# Patient Record
Sex: FEMALE | Employment: UNEMPLOYED | URBAN - METROPOLITAN AREA
[De-identification: names, ages, dates, MRNs, and addresses within clinical notes are randomized per-mention and may not be internally consistent; named-entity substitution may affect disease eponyms.]

---

## 2020-08-20 ENCOUNTER — OFFICE VISIT (OUTPATIENT)
Dept: NEUROLOGY | Facility: CLINIC | Age: 65
End: 2020-08-20
Payer: MEDICAID

## 2020-08-20 VITALS
HEART RATE: 86 BPM | BODY MASS INDEX: 27.99 KG/M2 | HEIGHT: 65 IN | TEMPERATURE: 98 F | DIASTOLIC BLOOD PRESSURE: 73 MMHG | RESPIRATION RATE: 16 BRPM | WEIGHT: 168 LBS | SYSTOLIC BLOOD PRESSURE: 128 MMHG

## 2020-08-20 DIAGNOSIS — H81.392 PERIPHERAL VERTIGO, LEFT: ICD-10-CM

## 2020-08-20 DIAGNOSIS — G47.09 OTHER INSOMNIA: ICD-10-CM

## 2020-08-20 DIAGNOSIS — F11.11 NARCOTIC ABUSE IN REMISSION (HCC): ICD-10-CM

## 2020-08-20 DIAGNOSIS — R44.1 VISUAL HALLUCINATIONS: Primary | ICD-10-CM

## 2020-08-20 PROBLEM — G47.00 INSOMNIA: Status: ACTIVE | Noted: 2020-08-20

## 2020-08-20 PROCEDURE — 3078F DIAST BP <80 MM HG: CPT | Performed by: PHYSICIAN ASSISTANT

## 2020-08-20 PROCEDURE — 3074F SYST BP LT 130 MM HG: CPT | Performed by: PHYSICIAN ASSISTANT

## 2020-08-20 PROCEDURE — 3008F BODY MASS INDEX DOCD: CPT | Performed by: PHYSICIAN ASSISTANT

## 2020-08-20 PROCEDURE — 99203 OFFICE O/P NEW LOW 30 MIN: CPT | Performed by: PHYSICIAN ASSISTANT

## 2020-08-20 PROCEDURE — 1111F DSCHRG MED/CURRENT MED MERGE: CPT | Performed by: PHYSICIAN ASSISTANT

## 2020-08-20 RX ORDER — VENLAFAXINE HYDROCHLORIDE 75 MG/1
75 CAPSULE, EXTENDED RELEASE ORAL DAILY
COMMUNITY

## 2020-08-20 RX ORDER — VENLAFAXINE HYDROCHLORIDE 150 MG/1
150 TABLET, EXTENDED RELEASE ORAL
COMMUNITY

## 2020-08-20 RX ORDER — CLONAZEPAM 1 MG/1
1 TABLET ORAL AS NEEDED
COMMUNITY

## 2020-08-20 NOTE — PROGRESS NOTES
[facility] with Dyana Beckett [address]  9/14/1955  Primary Care Provider:  Sae Flores    8/20/2020  Accompanied visit: Yes- Sister      59year old female patient being seen for: Visual H Diabetes    CT Brain(8/16/2020)  FINDINGS:    Beam hardening artifact from right sided earing mildly limits evaluation.     The ventricles, sulci, and basal cisterns are prominent, consistent with involutional changes.  Slight periventricular white matter l symmetric  FTN intact bilaterally. No drift on exam  Normal gait. Slight difficulties with tandem gait.  Swaying on romberg testing  Pass pointing on the right      Problem/s Identified this visit:   Visual hallucinations  (primary encounter diagnosis)  Oth

## 2020-08-20 NOTE — PATIENT INSTRUCTIONS
Refill policies:    • Allow 2-3 business days for refills; controlled substances may take longer.   • Contact your pharmacy at least 5 days prior to running out of medication and have them send an electronic request or submit request through the “request re Depending on your insurance carrier, approval may take 3-10 days. It is highly recommended patients contact their insurance carrier directly to determine coverage.   If test is done without insurance authorization, patient may be responsible for the entire for an EKG tracing. Head will be measured using a washable grease pencil. Head will be prepped with a mild abrasive for good conductivity. Electrodes are applied with paste and gauze.   Paste is water soluble so most of it will be cleaned out of your shelli

## 2021-01-01 ENCOUNTER — EXTERNAL RECORD (OUTPATIENT)
Dept: HEALTH INFORMATION MANAGEMENT | Facility: OTHER | Age: 66
End: 2021-01-01

## 2021-03-17 ENCOUNTER — TELEPHONE (OUTPATIENT)
Dept: SCHEDULING | Age: 66
End: 2021-03-17

## 2021-03-18 ENCOUNTER — TELEPHONE (OUTPATIENT)
Dept: SCHEDULING | Age: 66
End: 2021-03-18

## 2021-03-19 ENCOUNTER — OFFICE VISIT (OUTPATIENT)
Dept: INTERNAL MEDICINE | Age: 66
End: 2021-03-19

## 2021-03-19 VITALS
HEIGHT: 65 IN | TEMPERATURE: 98.3 F | HEART RATE: 97 BPM | DIASTOLIC BLOOD PRESSURE: 88 MMHG | RESPIRATION RATE: 16 BRPM | BODY MASS INDEX: 30.95 KG/M2 | WEIGHT: 185.75 LBS | SYSTOLIC BLOOD PRESSURE: 114 MMHG | OXYGEN SATURATION: 94 %

## 2021-03-19 DIAGNOSIS — G47.00 INSOMNIA, UNSPECIFIED TYPE: Primary | ICD-10-CM

## 2021-03-19 DIAGNOSIS — F32.A ANXIETY AND DEPRESSION: ICD-10-CM

## 2021-03-19 DIAGNOSIS — F41.9 ANXIETY AND DEPRESSION: ICD-10-CM

## 2021-03-19 PROCEDURE — 99204 OFFICE O/P NEW MOD 45 MIN: CPT | Performed by: INTERNAL MEDICINE

## 2021-03-19 RX ORDER — CLONAZEPAM 1 MG/1
1 TABLET ORAL NIGHTLY PRN
Qty: 30 TABLET | Refills: 3 | Status: SHIPPED | OUTPATIENT
Start: 2021-03-19 | End: 2021-06-14 | Stop reason: SDUPTHER

## 2021-03-19 RX ORDER — VENLAFAXINE HYDROCHLORIDE 75 MG/1
75 CAPSULE, EXTENDED RELEASE ORAL
COMMUNITY
End: 2021-07-13 | Stop reason: ALTCHOICE

## 2021-03-19 RX ORDER — DIPHENHYDRAMINE HCL, IBUPROFEN 25; 200 MG/1; MG/1
200 CAPSULE, LIQUID FILLED ORAL
COMMUNITY

## 2021-03-19 RX ORDER — CLONAZEPAM 0.5 MG/1
0.5 TABLET ORAL 3 TIMES DAILY
COMMUNITY
Start: 2021-03-10 | End: 2021-07-13 | Stop reason: ALTCHOICE

## 2021-03-19 RX ORDER — CLONAZEPAM 1 MG/1
1 TABLET ORAL 3 TIMES DAILY
COMMUNITY
Start: 2020-12-29 | End: 2021-03-19 | Stop reason: SDUPTHER

## 2021-03-19 SDOH — HEALTH STABILITY: MENTAL HEALTH
STRESS IS WHEN SOMEONE FEELS TENSE, NERVOUS, ANXIOUS, OR CAN'T SLEEP AT NIGHT BECAUSE THEIR MIND IS TROUBLED. HOW STRESSED ARE YOU?: VERY MUCH

## 2021-03-19 SDOH — HEALTH STABILITY: MENTAL HEALTH: HOW OFTEN DO YOU HAVE A DRINK CONTAINING ALCOHOL?: 2-4 TIMES A MONTH

## 2021-03-19 SDOH — HEALTH STABILITY: PHYSICAL HEALTH: ON AVERAGE, HOW MANY MINUTES DO YOU ENGAGE IN EXERCISE AT THIS LEVEL?: 0 MIN

## 2021-03-19 SDOH — HEALTH STABILITY: PHYSICAL HEALTH: ON AVERAGE, HOW MANY DAYS PER WEEK DO YOU ENGAGE IN MODERATE TO STRENUOUS EXERCISE (LIKE A BRISK WALK)?: 0 DAYS

## 2021-03-19 ASSESSMENT — PATIENT HEALTH QUESTIONNAIRE - PHQ9
SUM OF ALL RESPONSES TO PHQ9 QUESTIONS 1 AND 2: 2
CLINICAL INTERPRETATION OF PHQ2 SCORE: NO FURTHER SCREENING NEEDED
2. FEELING DOWN, DEPRESSED OR HOPELESS: MORE THAN HALF THE DAYS
CLINICAL INTERPRETATION OF PHQ9 SCORE: NO FURTHER SCREENING NEEDED
SUM OF ALL RESPONSES TO PHQ9 QUESTIONS 1 AND 2: 2
1. LITTLE INTEREST OR PLEASURE IN DOING THINGS: NOT AT ALL

## 2021-04-24 ENCOUNTER — TELEPHONE (OUTPATIENT)
Dept: SCHEDULING | Age: 66
End: 2021-04-24

## 2021-04-26 ENCOUNTER — APPOINTMENT (OUTPATIENT)
Dept: INTERNAL MEDICINE | Age: 66
End: 2021-04-26

## 2021-05-03 ENCOUNTER — TELEPHONE (OUTPATIENT)
Dept: SCHEDULING | Age: 66
End: 2021-05-03

## 2021-05-10 ENCOUNTER — TELEPHONE (OUTPATIENT)
Dept: SCHEDULING | Age: 66
End: 2021-05-10

## 2021-05-10 ENCOUNTER — APPOINTMENT (OUTPATIENT)
Dept: INTERNAL MEDICINE | Age: 66
End: 2021-05-10

## 2021-05-13 ENCOUNTER — OFFICE VISIT (OUTPATIENT)
Dept: INTERNAL MEDICINE | Age: 66
End: 2021-05-13

## 2021-05-13 VITALS
HEART RATE: 84 BPM | BODY MASS INDEX: 64.41 KG/M2 | RESPIRATION RATE: 16 BRPM | TEMPERATURE: 98.1 F | SYSTOLIC BLOOD PRESSURE: 108 MMHG | OXYGEN SATURATION: 95 % | WEIGHT: 293 LBS | DIASTOLIC BLOOD PRESSURE: 70 MMHG

## 2021-05-13 DIAGNOSIS — F41.9 ANXIETY AND DEPRESSION: ICD-10-CM

## 2021-05-13 DIAGNOSIS — G47.00 INSOMNIA, UNSPECIFIED TYPE: Primary | ICD-10-CM

## 2021-05-13 DIAGNOSIS — F32.A ANXIETY AND DEPRESSION: ICD-10-CM

## 2021-05-13 PROCEDURE — 99213 OFFICE O/P EST LOW 20 MIN: CPT | Performed by: INTERNAL MEDICINE

## 2021-05-13 RX ORDER — GABAPENTIN 300 MG/1
300 CAPSULE ORAL
COMMUNITY
Start: 2021-04-22 | End: 2021-05-13 | Stop reason: SDUPTHER

## 2021-05-13 RX ORDER — GABAPENTIN 300 MG/1
300 CAPSULE ORAL 3 TIMES DAILY
Qty: 90 CAPSULE | Refills: 3 | Status: SHIPPED | OUTPATIENT
Start: 2021-05-13 | End: 2021-09-07

## 2021-05-13 RX ORDER — QUETIAPINE FUMARATE 100 MG/1
100 TABLET, FILM COATED ORAL
COMMUNITY
Start: 2021-04-22 | End: 2021-05-13 | Stop reason: SDUPTHER

## 2021-05-13 RX ORDER — QUETIAPINE FUMARATE 100 MG/1
100 TABLET, FILM COATED ORAL DAILY
Qty: 90 TABLET | Refills: 3 | Status: SHIPPED | OUTPATIENT
Start: 2021-05-13 | End: 2021-07-13 | Stop reason: ALTCHOICE

## 2021-05-13 RX ORDER — QUETIAPINE FUMARATE 50 MG/1
50 TABLET, FILM COATED ORAL PRN
Qty: 30 TABLET | Refills: 3 | Status: SHIPPED | OUTPATIENT
Start: 2021-05-13 | End: 2021-07-13 | Stop reason: ALTCHOICE

## 2021-05-13 RX ORDER — CETIRIZINE HYDROCHLORIDE 10 MG/1
10 TABLET ORAL
COMMUNITY
End: 2021-07-13 | Stop reason: ALTCHOICE

## 2021-05-13 RX ORDER — QUETIAPINE FUMARATE 50 MG/1
50 TABLET, FILM COATED ORAL
COMMUNITY
Start: 2021-04-22 | End: 2021-05-13 | Stop reason: SDUPTHER

## 2021-05-13 ASSESSMENT — COGNITIVE AND FUNCTIONAL STATUS - GENERAL
DO YOU HAVE SERIOUS DIFFICULTY WALKING OR CLIMBING STAIRS: NO
DO YOU HAVE DIFFICULTY DRESSING OR BATHING: NO
BECAUSE OF A PHYSICAL, MENTAL, OR EMOTIONAL CONDITION, DO YOU HAVE DIFFICULTY DOING ERRANDS ALONE: NO
BECAUSE OF A PHYSICAL, MENTAL, OR EMOTIONAL CONDITION, DO YOU HAVE SERIOUS DIFFICULTY CONCENTRATING, REMEMBERING OR MAKING DECISIONS: NO

## 2021-05-13 ASSESSMENT — PATIENT HEALTH QUESTIONNAIRE - PHQ9
SUM OF ALL RESPONSES TO PHQ9 QUESTIONS 1 AND 2: 1
CLINICAL INTERPRETATION OF PHQ9 SCORE: NO FURTHER SCREENING NEEDED
1. LITTLE INTEREST OR PLEASURE IN DOING THINGS: NOT AT ALL
CLINICAL INTERPRETATION OF PHQ2 SCORE: NO FURTHER SCREENING NEEDED
2. FEELING DOWN, DEPRESSED OR HOPELESS: SEVERAL DAYS
SUM OF ALL RESPONSES TO PHQ9 QUESTIONS 1 AND 2: 1

## 2021-06-14 DIAGNOSIS — F32.A ANXIETY AND DEPRESSION: ICD-10-CM

## 2021-06-14 DIAGNOSIS — F41.9 ANXIETY AND DEPRESSION: ICD-10-CM

## 2021-06-14 DIAGNOSIS — G47.00 INSOMNIA, UNSPECIFIED TYPE: ICD-10-CM

## 2021-06-15 RX ORDER — CLONAZEPAM 1 MG/1
1 TABLET ORAL NIGHTLY PRN
Qty: 30 TABLET | Refills: 3 | Status: SHIPPED | OUTPATIENT
Start: 2021-06-15 | End: 2021-06-28 | Stop reason: SDUPTHER

## 2021-06-28 DIAGNOSIS — F32.A ANXIETY AND DEPRESSION: ICD-10-CM

## 2021-06-28 DIAGNOSIS — F41.9 ANXIETY AND DEPRESSION: ICD-10-CM

## 2021-06-28 DIAGNOSIS — G47.00 INSOMNIA, UNSPECIFIED TYPE: ICD-10-CM

## 2021-06-28 RX ORDER — CLONAZEPAM 1 MG/1
1 TABLET ORAL NIGHTLY PRN
Qty: 30 TABLET | Refills: 3 | Status: SHIPPED | OUTPATIENT
Start: 2021-06-28 | End: 2021-07-20 | Stop reason: SDUPTHER

## 2021-07-12 ENCOUNTER — PROCEDURE VISIT (OUTPATIENT)
Dept: NEUROLOGY | Facility: CLINIC | Age: 66
End: 2021-07-12

## 2021-07-12 ENCOUNTER — NURSE ONLY (OUTPATIENT)
Dept: ELECTROPHYSIOLOGY | Facility: HOSPITAL | Age: 66
End: 2021-07-12
Attending: PHYSICIAN ASSISTANT
Payer: MEDICARE

## 2021-07-12 PROCEDURE — 95819 EEG AWAKE AND ASLEEP: CPT

## 2021-07-12 PROCEDURE — 95816 EEG AWAKE AND DROWSY: CPT | Performed by: OTHER

## 2021-07-15 ENCOUNTER — TELEPHONE (OUTPATIENT)
Dept: NEUROLOGY | Facility: CLINIC | Age: 66
End: 2021-07-15

## 2021-07-16 ENCOUNTER — HOSPITAL ENCOUNTER (OUTPATIENT)
Dept: BEHAVIORAL HEALTH | Age: 66
Discharge: STILL ADMITTED OR ADMINISTRATIVE | End: 2021-07-16
Attending: PSYCHIATRY & NEUROLOGY

## 2021-07-16 DIAGNOSIS — G47.00 INSOMNIA, UNSPECIFIED TYPE: ICD-10-CM

## 2021-07-16 DIAGNOSIS — F41.9 ANXIETY AND DEPRESSION: ICD-10-CM

## 2021-07-16 DIAGNOSIS — F32.A ANXIETY AND DEPRESSION: ICD-10-CM

## 2021-07-16 PROCEDURE — 90853 GROUP PSYCHOTHERAPY: CPT

## 2021-07-16 PROCEDURE — 90792 PSYCH DIAG EVAL W/MED SRVCS: CPT | Performed by: PSYCHIATRY & NEUROLOGY

## 2021-07-16 RX ORDER — CLONAZEPAM 1 MG/1
1 TABLET ORAL NIGHTLY PRN
Qty: 30 TABLET | Refills: 3 | Status: CANCELLED | OUTPATIENT
Start: 2021-07-16

## 2021-07-19 ENCOUNTER — HOSPITAL ENCOUNTER (OUTPATIENT)
Dept: BEHAVIORAL HEALTH | Age: 66
Discharge: STILL ADMITTED OR ADMINISTRATIVE | End: 2021-07-19
Attending: PSYCHIATRY & NEUROLOGY

## 2021-07-19 PROCEDURE — 90853 GROUP PSYCHOTHERAPY: CPT

## 2021-07-20 ENCOUNTER — HOSPITAL ENCOUNTER (OUTPATIENT)
Dept: BEHAVIORAL HEALTH | Age: 66
Discharge: STILL ADMITTED OR ADMINISTRATIVE | End: 2021-07-20
Attending: PSYCHIATRY & NEUROLOGY

## 2021-07-20 PROCEDURE — 90853 GROUP PSYCHOTHERAPY: CPT

## 2021-07-20 RX ORDER — CLONAZEPAM 1 MG/1
1 TABLET ORAL NIGHTLY PRN
Qty: 90 TABLET | Refills: 1 | Status: SHIPPED | OUTPATIENT
Start: 2021-07-20 | End: 2023-06-28

## 2021-07-21 ENCOUNTER — TELEPHONE (OUTPATIENT)
Dept: BEHAVIORAL HEALTH | Age: 66
End: 2021-07-21

## 2021-07-21 NOTE — PROGRESS NOTES
[facility] in [place]  in affiliation with [facility]  [address]  [address]  [phone]  Fax [phone]      Name: Samaria Víctor  9/14/1955  Date of Stud a concern for epilepsy, then sleep deprived or prolonged EEG may provide more information.            Maim Khoury MD  Vascular & General Neurology  [facility]  Date prepared: 7/20/2021

## 2021-07-22 ENCOUNTER — HOSPITAL ENCOUNTER (OUTPATIENT)
Dept: BEHAVIORAL HEALTH | Age: 66
Discharge: STILL ADMITTED OR ADMINISTRATIVE | End: 2021-07-22
Attending: PSYCHIATRY & NEUROLOGY

## 2021-07-23 ENCOUNTER — APPOINTMENT (OUTPATIENT)
Dept: BEHAVIORAL HEALTH | Age: 66
End: 2021-07-23
Attending: PSYCHIATRY & NEUROLOGY

## 2021-07-26 ENCOUNTER — APPOINTMENT (OUTPATIENT)
Dept: BEHAVIORAL HEALTH | Age: 66
End: 2021-07-26
Attending: PSYCHIATRY & NEUROLOGY

## 2021-07-27 ENCOUNTER — APPOINTMENT (OUTPATIENT)
Dept: BEHAVIORAL HEALTH | Age: 66
End: 2021-07-27
Attending: PSYCHIATRY & NEUROLOGY

## 2021-07-28 ENCOUNTER — APPOINTMENT (OUTPATIENT)
Dept: BEHAVIORAL HEALTH | Age: 66
End: 2021-07-28
Attending: PSYCHIATRY & NEUROLOGY

## 2021-07-29 ENCOUNTER — APPOINTMENT (OUTPATIENT)
Dept: BEHAVIORAL HEALTH | Age: 66
End: 2021-07-29
Attending: PSYCHIATRY & NEUROLOGY

## 2021-08-06 ENCOUNTER — TELEPHONE (OUTPATIENT)
Dept: SCHEDULING | Age: 66
End: 2021-08-06

## 2021-08-09 RX ORDER — CLONAZEPAM 1 MG/1
1 TABLET ORAL NIGHTLY PRN
Qty: 30 TABLET | Refills: 3 | Status: SHIPPED | OUTPATIENT
Start: 2021-08-09 | End: 2023-06-28

## 2021-08-12 NOTE — TELEPHONE ENCOUNTER
Left message a while back that EEG was normal. If she had more questions to please call us back    Dr Antony Soliz

## 2021-09-07 DIAGNOSIS — F32.A ANXIETY AND DEPRESSION: ICD-10-CM

## 2021-09-07 DIAGNOSIS — F41.9 ANXIETY AND DEPRESSION: ICD-10-CM

## 2021-09-07 RX ORDER — GABAPENTIN 300 MG/1
CAPSULE ORAL
Qty: 90 CAPSULE | Refills: 0 | Status: SHIPPED | OUTPATIENT
Start: 2021-09-07 | End: 2023-06-28

## 2021-09-15 ENCOUNTER — TELEPHONE (OUTPATIENT)
Dept: SCHEDULING | Age: 66
End: 2021-09-15

## 2021-09-16 ENCOUNTER — TELEPHONE (OUTPATIENT)
Dept: SCHEDULING | Age: 66
End: 2021-09-16

## 2021-09-16 ENCOUNTER — LAB SERVICES (OUTPATIENT)
Dept: LAB | Age: 66
End: 2021-09-16

## 2021-09-16 ENCOUNTER — OFFICE VISIT (OUTPATIENT)
Dept: INTERNAL MEDICINE | Age: 66
End: 2021-09-16

## 2021-09-16 VITALS
WEIGHT: 162.6 LBS | TEMPERATURE: 98.4 F | BODY MASS INDEX: 27.22 KG/M2 | SYSTOLIC BLOOD PRESSURE: 146 MMHG | DIASTOLIC BLOOD PRESSURE: 94 MMHG | RESPIRATION RATE: 16 BRPM | OXYGEN SATURATION: 97 % | HEART RATE: 97 BPM

## 2021-09-16 DIAGNOSIS — F32.A ANXIETY AND DEPRESSION: Primary | ICD-10-CM

## 2021-09-16 DIAGNOSIS — B18.2 HEPATITIS C VIRUS CARRIER STATE (CMD): ICD-10-CM

## 2021-09-16 DIAGNOSIS — F32.A ANXIETY AND DEPRESSION: ICD-10-CM

## 2021-09-16 DIAGNOSIS — F41.9 ANXIETY AND DEPRESSION: Primary | ICD-10-CM

## 2021-09-16 DIAGNOSIS — F41.9 ANXIETY AND DEPRESSION: ICD-10-CM

## 2021-09-16 LAB
ALBUMIN SERPL-MCNC: 4 G/DL (ref 3.6–5.1)
ALBUMIN/GLOB SERPL: 1 {RATIO} (ref 1–2.4)
ALP SERPL-CCNC: 123 UNITS/L (ref 45–117)
ALT SERPL-CCNC: 188 UNITS/L
ANION GAP SERPL CALC-SCNC: 8 MMOL/L (ref 10–20)
APPEARANCE UR: CLEAR
AST SERPL-CCNC: 153 UNITS/L
BASOPHILS # BLD: 0 K/MCL (ref 0–0.3)
BASOPHILS NFR BLD: 0 %
BILIRUB SERPL-MCNC: 0.4 MG/DL (ref 0.2–1)
BILIRUB UR QL STRIP: NEGATIVE
BUN SERPL-MCNC: 28 MG/DL (ref 6–20)
BUN/CREAT SERPL: 35 (ref 7–25)
CALCIUM SERPL-MCNC: 9.6 MG/DL (ref 8.4–10.2)
CHLORIDE SERPL-SCNC: 103 MMOL/L (ref 98–107)
CO2 SERPL-SCNC: 25 MMOL/L (ref 21–32)
COLOR UR: NORMAL
CREAT SERPL-MCNC: 0.8 MG/DL (ref 0.51–0.95)
DEPRECATED RDW RBC: 48 FL (ref 39–50)
EOSINOPHIL # BLD: 0.1 K/MCL (ref 0–0.5)
EOSINOPHIL NFR BLD: 1 %
ERYTHROCYTE [DISTWIDTH] IN BLOOD: 14 % (ref 11–15)
FASTING DURATION TIME PATIENT: 12 HOURS (ref 0–999)
GFR SERPLBLD BASED ON 1.73 SQ M-ARVRAT: 77 ML/MIN
GLOBULIN SER-MCNC: 4 G/DL (ref 2–4)
GLUCOSE SERPL-MCNC: 107 MG/DL (ref 65–99)
GLUCOSE UR STRIP-MCNC: NEGATIVE MG/DL
HCT VFR BLD CALC: 40.5 % (ref 36–46.5)
HGB BLD-MCNC: 13.7 G/DL (ref 12–15.5)
HGB UR QL STRIP: NEGATIVE
IMM GRANULOCYTES # BLD AUTO: 0 K/MCL (ref 0–0.2)
IMM GRANULOCYTES # BLD: 0 %
KETONES UR STRIP-MCNC: NEGATIVE MG/DL
LEUKOCYTE ESTERASE UR QL STRIP: NEGATIVE
LYMPHOCYTES # BLD: 1.2 K/MCL (ref 1–4)
LYMPHOCYTES NFR BLD: 19 %
MCH RBC QN AUTO: 31.4 PG (ref 26–34)
MCHC RBC AUTO-ENTMCNC: 33.8 G/DL (ref 32–36.5)
MCV RBC AUTO: 92.9 FL (ref 78–100)
MONOCYTES # BLD: 0.6 K/MCL (ref 0.3–0.9)
MONOCYTES NFR BLD: 11 %
NEUTROPHILS # BLD: 4.2 K/MCL (ref 1.8–7.7)
NEUTROPHILS NFR BLD: 69 %
NITRITE UR QL STRIP: NEGATIVE
NRBC BLD MANUAL-RTO: 0 /100 WBC
PH UR STRIP: 6 [PH] (ref 5–7)
PLATELET # BLD AUTO: 208 K/MCL (ref 140–450)
POTASSIUM SERPL-SCNC: 3.8 MMOL/L (ref 3.4–5.1)
PROT SERPL-MCNC: 8 G/DL (ref 6.4–8.2)
PROT UR STRIP-MCNC: NEGATIVE MG/DL
RBC # BLD: 4.36 MIL/MCL (ref 4–5.2)
SODIUM SERPL-SCNC: 132 MMOL/L (ref 135–145)
SP GR UR STRIP: 1.01 (ref 1–1.03)
UROBILINOGEN UR STRIP-MCNC: 0.2 MG/DL
WBC # BLD: 6.1 K/MCL (ref 4.2–11)

## 2021-09-16 PROCEDURE — 99213 OFFICE O/P EST LOW 20 MIN: CPT | Performed by: INTERNAL MEDICINE

## 2021-09-16 PROCEDURE — 81003 URINALYSIS AUTO W/O SCOPE: CPT | Performed by: CLINICAL MEDICAL LABORATORY

## 2021-09-16 PROCEDURE — 36415 COLL VENOUS BLD VENIPUNCTURE: CPT | Performed by: CLINICAL MEDICAL LABORATORY

## 2021-09-16 PROCEDURE — 85025 COMPLETE CBC W/AUTO DIFF WBC: CPT | Performed by: CLINICAL MEDICAL LABORATORY

## 2021-09-16 PROCEDURE — 80053 COMPREHEN METABOLIC PANEL: CPT | Performed by: CLINICAL MEDICAL LABORATORY

## 2021-09-16 RX ORDER — ALPRAZOLAM 0.25 MG/1
0.25 TABLET ORAL 3 TIMES DAILY
Qty: 30 TABLET | Refills: 0 | Status: SHIPPED | OUTPATIENT
Start: 2021-09-16 | End: 2021-09-23 | Stop reason: SDUPTHER

## 2021-09-16 RX ORDER — SERTRALINE HYDROCHLORIDE 100 MG/1
100 TABLET, FILM COATED ORAL DAILY
Qty: 90 TABLET | Refills: 3 | Status: SHIPPED | OUTPATIENT
Start: 2021-09-16 | End: 2022-01-01 | Stop reason: SDUPTHER

## 2021-09-23 ENCOUNTER — TELEPHONE (OUTPATIENT)
Dept: SCHEDULING | Age: 66
End: 2021-09-23

## 2021-09-23 DIAGNOSIS — F32.A ANXIETY AND DEPRESSION: ICD-10-CM

## 2021-09-23 DIAGNOSIS — F41.9 ANXIETY AND DEPRESSION: ICD-10-CM

## 2021-09-23 RX ORDER — ALPRAZOLAM 0.25 MG/1
0.25 TABLET ORAL 3 TIMES DAILY
Qty: 30 TABLET | Refills: 0 | Status: SHIPPED | OUTPATIENT
Start: 2021-09-23 | End: 2021-10-05 | Stop reason: SDUPTHER

## 2021-10-05 DIAGNOSIS — F32.A ANXIETY AND DEPRESSION: ICD-10-CM

## 2021-10-05 DIAGNOSIS — F41.9 ANXIETY AND DEPRESSION: ICD-10-CM

## 2021-10-05 RX ORDER — ALPRAZOLAM 0.25 MG/1
0.25 TABLET ORAL 3 TIMES DAILY
Qty: 30 TABLET | Refills: 0 | Status: SHIPPED | OUTPATIENT
Start: 2021-10-05 | End: 2021-10-13 | Stop reason: SDUPTHER

## 2021-10-10 ENCOUNTER — TELEPHONE (OUTPATIENT)
Dept: INTERNAL MEDICINE | Age: 66
End: 2021-10-10

## 2021-10-13 DIAGNOSIS — F41.9 ANXIETY AND DEPRESSION: ICD-10-CM

## 2021-10-13 DIAGNOSIS — F32.A ANXIETY AND DEPRESSION: ICD-10-CM

## 2021-10-14 RX ORDER — ALPRAZOLAM 0.25 MG/1
0.25 TABLET ORAL 3 TIMES DAILY
Qty: 30 TABLET | Refills: 0 | Status: SHIPPED | OUTPATIENT
Start: 2021-10-14 | End: 2021-10-23 | Stop reason: SDUPTHER

## 2021-10-23 DIAGNOSIS — F41.9 ANXIETY AND DEPRESSION: ICD-10-CM

## 2021-10-23 DIAGNOSIS — F32.A ANXIETY AND DEPRESSION: ICD-10-CM

## 2021-10-25 ENCOUNTER — TELEPHONE (OUTPATIENT)
Dept: SCHEDULING | Age: 66
End: 2021-10-25

## 2021-10-25 RX ORDER — ALPRAZOLAM 0.25 MG/1
0.25 TABLET ORAL 3 TIMES DAILY
Qty: 30 TABLET | Refills: 0 | Status: SHIPPED | OUTPATIENT
Start: 2021-10-25 | End: 2021-11-02 | Stop reason: SDUPTHER

## 2021-11-01 ENCOUNTER — TELEPHONE (OUTPATIENT)
Dept: SCHEDULING | Age: 66
End: 2021-11-01

## 2021-11-02 DIAGNOSIS — F32.A ANXIETY AND DEPRESSION: ICD-10-CM

## 2021-11-02 DIAGNOSIS — F41.9 ANXIETY AND DEPRESSION: ICD-10-CM

## 2021-11-02 RX ORDER — ALPRAZOLAM 0.25 MG/1
0.25 TABLET ORAL 3 TIMES DAILY
Qty: 30 TABLET | Refills: 0 | Status: SHIPPED | OUTPATIENT
Start: 2021-11-02 | End: 2021-11-11 | Stop reason: SDUPTHER

## 2021-11-04 ENCOUNTER — APPOINTMENT (OUTPATIENT)
Dept: LAB | Age: 66
End: 2021-11-04

## 2021-11-04 ENCOUNTER — HOSPITAL ENCOUNTER (OUTPATIENT)
Dept: LAB | Age: 66
Discharge: HOME | End: 2021-11-04
Attending: INTERNAL MEDICINE

## 2021-11-04 ENCOUNTER — LAB REQUISITION (OUTPATIENT)
Dept: LAB | Age: 66
End: 2021-11-04

## 2021-11-04 DIAGNOSIS — B19.20 UNSPECIFIED VIRAL HEPATITIS C WITHOUT HEPATIC COMA: ICD-10-CM

## 2021-11-04 LAB
HIV 1+2 AB+HIV1 P24 AG SERPL QL IA: NONREACTIVE
IRON SATN MFR SERPL: 31 % (ref 15–45)
IRON SERPL-MCNC: 141 MCG/DL (ref 50–170)
TIBC SERPL-MCNC: 460 MCG/DL (ref 250–450)

## 2021-11-04 PROCEDURE — 86704 HEP B CORE ANTIBODY TOTAL: CPT | Performed by: CLINICAL MEDICAL LABORATORY

## 2021-11-04 PROCEDURE — 86038 ANTINUCLEAR ANTIBODIES: CPT | Performed by: CLINICAL MEDICAL LABORATORY

## 2021-11-04 PROCEDURE — 87389 HIV-1 AG W/HIV-1&-2 AB AG IA: CPT | Performed by: CLINICAL MEDICAL LABORATORY

## 2021-11-04 PROCEDURE — 83540 ASSAY OF IRON: CPT | Performed by: CLINICAL MEDICAL LABORATORY

## 2021-11-04 PROCEDURE — 83550 IRON BINDING TEST: CPT | Performed by: CLINICAL MEDICAL LABORATORY

## 2021-11-04 PROCEDURE — 36415 COLL VENOUS BLD VENIPUNCTURE: CPT | Performed by: CLINICAL MEDICAL LABORATORY

## 2021-11-04 PROCEDURE — 87522 HEPATITIS C REVRS TRNSCRPJ: CPT | Performed by: CLINICAL MEDICAL LABORATORY

## 2021-11-04 PROCEDURE — 87902 NFCT AGT GNTYP ALYS HEP C: CPT | Performed by: CLINICAL MEDICAL LABORATORY

## 2021-11-04 PROCEDURE — 87340 HEPATITIS B SURFACE AG IA: CPT | Performed by: CLINICAL MEDICAL LABORATORY

## 2021-11-04 PROCEDURE — 86706 HEP B SURFACE ANTIBODY: CPT | Performed by: CLINICAL MEDICAL LABORATORY

## 2021-11-05 LAB
ANA SER QL IA: NEGATIVE
HCV RNA SERPL NAA+PROBE-ACNC: ABNORMAL IUNITS/ML
HCV RNA SERPL NAA+PROBE-LOG IU: 6.64 IU/ML
SERVICE CMNT-IMP: ABNORMAL

## 2021-11-11 DIAGNOSIS — F41.9 ANXIETY AND DEPRESSION: ICD-10-CM

## 2021-11-11 DIAGNOSIS — F32.A ANXIETY AND DEPRESSION: ICD-10-CM

## 2021-11-11 RX ORDER — ALPRAZOLAM 0.25 MG/1
0.25 TABLET ORAL 3 TIMES DAILY
Qty: 30 TABLET | Refills: 0 | Status: SHIPPED | OUTPATIENT
Start: 2021-11-11 | End: 2021-11-19 | Stop reason: SDUPTHER

## 2021-11-12 LAB
HCV GENTYP SERPL NAA+PROBE: 2
SERVICE CMNT-IMP: ABNORMAL

## 2021-11-19 DIAGNOSIS — F41.9 ANXIETY AND DEPRESSION: ICD-10-CM

## 2021-11-19 DIAGNOSIS — F32.A ANXIETY AND DEPRESSION: ICD-10-CM

## 2021-11-19 RX ORDER — ALPRAZOLAM 0.25 MG/1
0.25 TABLET ORAL 3 TIMES DAILY
Qty: 30 TABLET | Refills: 0 | Status: SHIPPED | OUTPATIENT
Start: 2021-11-19 | End: 2021-11-28 | Stop reason: SDUPTHER

## 2021-11-24 ENCOUNTER — EXTERNAL RECORD (OUTPATIENT)
Dept: HEALTH INFORMATION MANAGEMENT | Facility: OTHER | Age: 66
End: 2021-11-24

## 2021-11-28 DIAGNOSIS — F41.9 ANXIETY AND DEPRESSION: ICD-10-CM

## 2021-11-28 DIAGNOSIS — F32.A ANXIETY AND DEPRESSION: ICD-10-CM

## 2021-11-29 RX ORDER — ALPRAZOLAM 0.25 MG/1
0.25 TABLET ORAL 3 TIMES DAILY
Qty: 30 TABLET | Refills: 0 | Status: SHIPPED | OUTPATIENT
Start: 2021-11-29 | End: 2021-12-09 | Stop reason: SDUPTHER

## 2021-12-07 DIAGNOSIS — F41.9 ANXIETY AND DEPRESSION: ICD-10-CM

## 2021-12-07 DIAGNOSIS — F32.A ANXIETY AND DEPRESSION: ICD-10-CM

## 2021-12-08 DIAGNOSIS — F41.9 ANXIETY AND DEPRESSION: ICD-10-CM

## 2021-12-08 DIAGNOSIS — F32.A ANXIETY AND DEPRESSION: ICD-10-CM

## 2021-12-08 RX ORDER — ALPRAZOLAM 0.25 MG/1
0.25 TABLET ORAL 3 TIMES DAILY
Qty: 30 TABLET | Refills: 0 | Status: CANCELLED | OUTPATIENT
Start: 2021-12-08

## 2021-12-09 ENCOUNTER — TELEPHONE (OUTPATIENT)
Dept: SCHEDULING | Age: 66
End: 2021-12-09

## 2021-12-09 DIAGNOSIS — F32.A ANXIETY AND DEPRESSION: ICD-10-CM

## 2021-12-09 DIAGNOSIS — F41.9 ANXIETY AND DEPRESSION: ICD-10-CM

## 2021-12-09 RX ORDER — ALPRAZOLAM 0.25 MG/1
0.25 TABLET ORAL 3 TIMES DAILY
Qty: 30 TABLET | Refills: 0 | Status: SHIPPED | OUTPATIENT
Start: 2021-12-09 | End: 2021-12-17 | Stop reason: SDUPTHER

## 2021-12-17 ENCOUNTER — OFFICE VISIT (OUTPATIENT)
Dept: INTERNAL MEDICINE | Age: 66
End: 2021-12-17

## 2021-12-17 ENCOUNTER — HOSPITAL ENCOUNTER (OUTPATIENT)
Dept: GASTROENTEROLOGY | Age: 66
Discharge: HOME | End: 2021-12-17
Attending: INTERNAL MEDICINE

## 2021-12-17 VITALS
WEIGHT: 160 LBS | DIASTOLIC BLOOD PRESSURE: 84 MMHG | OXYGEN SATURATION: 98 % | SYSTOLIC BLOOD PRESSURE: 142 MMHG | BODY MASS INDEX: 26.79 KG/M2 | HEART RATE: 70 BPM | TEMPERATURE: 97 F

## 2021-12-17 DIAGNOSIS — K76.0 FATTY LIVER: ICD-10-CM

## 2021-12-17 DIAGNOSIS — F32.A ANXIETY AND DEPRESSION: ICD-10-CM

## 2021-12-17 DIAGNOSIS — F41.9 ANXIETY AND DEPRESSION: ICD-10-CM

## 2021-12-17 PROCEDURE — 99213 OFFICE O/P EST LOW 20 MIN: CPT | Performed by: INTERNAL MEDICINE

## 2021-12-17 PROCEDURE — 13000022 HB GI BASIC CASE  S/U +1ST 15 MIN

## 2021-12-17 RX ORDER — GLECAPREVIR AND PIBRENTASVIR 40; 100 MG/1; MG/1
TABLET, FILM COATED ORAL
COMMUNITY
Start: 2021-12-02

## 2021-12-17 RX ORDER — GLECAPREVIR AND PIBRENTASVIR 40; 100 MG/1; MG/1
TABLET, FILM COATED ORAL
COMMUNITY
Start: 2021-11-29

## 2021-12-17 RX ORDER — ALPRAZOLAM 0.25 MG/1
0.25 TABLET ORAL 3 TIMES DAILY
Qty: 90 TABLET | Refills: 0 | Status: SHIPPED | OUTPATIENT
Start: 2021-12-17 | End: 2022-06-09 | Stop reason: SDUPTHER

## 2021-12-18 ENCOUNTER — TELEPHONE (OUTPATIENT)
Dept: SCHEDULING | Age: 66
End: 2021-12-18

## 2021-12-18 DIAGNOSIS — F41.9 ANXIETY AND DEPRESSION: ICD-10-CM

## 2021-12-18 DIAGNOSIS — F32.A ANXIETY AND DEPRESSION: ICD-10-CM

## 2021-12-18 RX ORDER — ALPRAZOLAM 0.25 MG/1
0.25 TABLET ORAL 3 TIMES DAILY
Qty: 90 TABLET | Refills: 0 | Status: CANCELLED | OUTPATIENT
Start: 2021-12-18

## 2022-01-01 ENCOUNTER — TELEPHONE (OUTPATIENT)
Dept: INTERNAL MEDICINE | Age: 67
End: 2022-01-01

## 2022-01-01 ENCOUNTER — TELEPHONE (OUTPATIENT)
Dept: SCHEDULING | Age: 67
End: 2022-01-01

## 2022-01-01 ENCOUNTER — EXTERNAL RECORD (OUTPATIENT)
Dept: OTHER | Age: 67
End: 2022-01-01

## 2022-01-01 ENCOUNTER — APPOINTMENT (OUTPATIENT)
Dept: INTERNAL MEDICINE | Age: 67
End: 2022-01-01

## 2022-01-01 ENCOUNTER — OFFICE VISIT (OUTPATIENT)
Dept: INTERNAL MEDICINE | Age: 67
End: 2022-01-01

## 2022-01-01 VITALS
DIASTOLIC BLOOD PRESSURE: 90 MMHG | WEIGHT: 160.05 LBS | OXYGEN SATURATION: 99 % | BODY MASS INDEX: 26.67 KG/M2 | HEIGHT: 65 IN | HEART RATE: 88 BPM | SYSTOLIC BLOOD PRESSURE: 156 MMHG | TEMPERATURE: 98 F

## 2022-01-01 VITALS
DIASTOLIC BLOOD PRESSURE: 84 MMHG | WEIGHT: 153.44 LBS | SYSTOLIC BLOOD PRESSURE: 129 MMHG | RESPIRATION RATE: 16 BRPM | TEMPERATURE: 98 F | OXYGEN SATURATION: 99 % | BODY MASS INDEX: 25.56 KG/M2 | HEART RATE: 85 BPM | HEIGHT: 65 IN

## 2022-01-01 DIAGNOSIS — F41.9 ANXIETY: ICD-10-CM

## 2022-01-01 DIAGNOSIS — F32.A DEPRESSION: ICD-10-CM

## 2022-01-01 DIAGNOSIS — B18.2 HEPATITIS C VIRUS CARRIER STATE (CMD): ICD-10-CM

## 2022-01-01 DIAGNOSIS — Z00.00 PHYSICAL EXAM, ROUTINE: Primary | ICD-10-CM

## 2022-01-01 PROCEDURE — 99213 OFFICE O/P EST LOW 20 MIN: CPT | Performed by: INTERNAL MEDICINE

## 2022-01-01 PROCEDURE — 99397 PER PM REEVAL EST PAT 65+ YR: CPT | Performed by: INTERNAL MEDICINE

## 2022-01-01 RX ORDER — ALPRAZOLAM 0.25 MG/1
0.25 TABLET ORAL 3 TIMES DAILY
Qty: 90 TABLET | Refills: 0 | Status: SHIPPED | OUTPATIENT
Start: 2022-01-01 | End: 2023-01-01 | Stop reason: SDUPTHER

## 2022-01-01 RX ORDER — ALPRAZOLAM 0.25 MG/1
0.25 TABLET ORAL 3 TIMES DAILY
Qty: 90 TABLET | Refills: 0 | Status: SHIPPED | OUTPATIENT
Start: 2022-01-01 | End: 2022-01-01 | Stop reason: SDUPTHER

## 2022-01-01 RX ORDER — SERTRALINE HYDROCHLORIDE 100 MG/1
100 TABLET, FILM COATED ORAL DAILY
Qty: 90 TABLET | Refills: 3 | Status: SHIPPED | OUTPATIENT
Start: 2022-01-01 | End: 2023-01-01 | Stop reason: SDUPTHER

## 2022-01-01 RX ORDER — ALPRAZOLAM 0.25 MG/1
0.25 TABLET ORAL 3 TIMES DAILY
Qty: 90 TABLET | Refills: 0 | Status: CANCELLED | OUTPATIENT
Start: 2022-01-01

## 2022-01-01 ASSESSMENT — PATIENT HEALTH QUESTIONNAIRE - PHQ9
CLINICAL INTERPRETATION OF PHQ2 SCORE: NO FURTHER SCREENING NEEDED
1. LITTLE INTEREST OR PLEASURE IN DOING THINGS: NOT AT ALL
SUM OF ALL RESPONSES TO PHQ9 QUESTIONS 1 AND 2: 0
SUM OF ALL RESPONSES TO PHQ9 QUESTIONS 1 AND 2: 0
CLINICAL INTERPRETATION OF PHQ2 SCORE: NO FURTHER SCREENING NEEDED
1. LITTLE INTEREST OR PLEASURE IN DOING THINGS: NOT AT ALL
2. FEELING DOWN, DEPRESSED OR HOPELESS: NOT AT ALL
SUM OF ALL RESPONSES TO PHQ9 QUESTIONS 1 AND 2: 0
SUM OF ALL RESPONSES TO PHQ9 QUESTIONS 1 AND 2: 0
2. FEELING DOWN, DEPRESSED OR HOPELESS: NOT AT ALL

## 2022-01-01 ASSESSMENT — PAIN SCALES - GENERAL: PAINLEVEL: 0

## 2022-01-07 DIAGNOSIS — F32.A ANXIETY AND DEPRESSION: ICD-10-CM

## 2022-01-07 DIAGNOSIS — F41.9 ANXIETY AND DEPRESSION: ICD-10-CM

## 2022-01-08 DIAGNOSIS — F41.9 ANXIETY AND DEPRESSION: ICD-10-CM

## 2022-01-08 DIAGNOSIS — F32.A ANXIETY AND DEPRESSION: ICD-10-CM

## 2022-01-08 RX ORDER — ALPRAZOLAM 0.25 MG/1
0.25 TABLET ORAL 3 TIMES DAILY
Qty: 90 TABLET | Refills: 0 | Status: CANCELLED | OUTPATIENT
Start: 2022-01-08

## 2022-01-10 ENCOUNTER — TELEPHONE (OUTPATIENT)
Dept: SCHEDULING | Age: 67
End: 2022-01-10

## 2022-01-10 RX ORDER — ALPRAZOLAM 0.25 MG/1
0.25 TABLET ORAL 3 TIMES DAILY
Qty: 30 TABLET | Refills: 0 | Status: SHIPPED | OUTPATIENT
Start: 2022-01-10 | End: 2022-01-27 | Stop reason: SDUPTHER

## 2022-01-11 ENCOUNTER — TELEPHONE (OUTPATIENT)
Dept: SCHEDULING | Age: 67
End: 2022-01-11

## 2022-01-12 RX ORDER — ALPRAZOLAM 0.25 MG/1
0.25 TABLET ORAL 3 TIMES DAILY
Qty: 30 TABLET | Refills: 0 | Status: CANCELLED | OUTPATIENT
Start: 2022-01-12

## 2022-01-12 RX ORDER — ALPRAZOLAM 0.25 MG/1
0.25 TABLET ORAL 3 TIMES DAILY
Qty: 90 TABLET | Refills: 0 | Status: CANCELLED | OUTPATIENT
Start: 2022-01-12

## 2022-01-27 ENCOUNTER — OFFICE VISIT (OUTPATIENT)
Dept: INTERNAL MEDICINE | Age: 67
End: 2022-01-27

## 2022-01-27 VITALS
TEMPERATURE: 97.8 F | RESPIRATION RATE: 18 BRPM | BODY MASS INDEX: 27.32 KG/M2 | HEART RATE: 92 BPM | WEIGHT: 164 LBS | SYSTOLIC BLOOD PRESSURE: 114 MMHG | OXYGEN SATURATION: 98 % | DIASTOLIC BLOOD PRESSURE: 88 MMHG | HEIGHT: 65 IN

## 2022-01-27 DIAGNOSIS — F41.9 ANXIETY: ICD-10-CM

## 2022-01-27 PROCEDURE — 99213 OFFICE O/P EST LOW 20 MIN: CPT | Performed by: INTERNAL MEDICINE

## 2022-01-27 RX ORDER — ALPRAZOLAM 0.25 MG/1
0.25 TABLET ORAL 3 TIMES DAILY
Qty: 60 TABLET | Refills: 0 | Status: SHIPPED | OUTPATIENT
Start: 2022-01-27 | End: 2022-02-14 | Stop reason: SDUPTHER

## 2022-01-27 RX ORDER — ALPRAZOLAM 0.5 MG/1
TABLET ORAL
COMMUNITY
Start: 2022-01-20

## 2022-01-27 ASSESSMENT — PAIN SCALES - GENERAL: PAINLEVEL: 0

## 2022-02-14 DIAGNOSIS — F41.9 ANXIETY: ICD-10-CM

## 2022-02-14 RX ORDER — ALPRAZOLAM 0.25 MG/1
0.25 TABLET ORAL 3 TIMES DAILY
Qty: 60 TABLET | Refills: 0 | Status: CANCELLED | OUTPATIENT
Start: 2022-02-14

## 2022-02-14 RX ORDER — ALPRAZOLAM 0.25 MG/1
0.25 TABLET ORAL 3 TIMES DAILY
Qty: 60 TABLET | Refills: 0 | Status: SHIPPED | OUTPATIENT
Start: 2022-02-14 | End: 2022-03-03 | Stop reason: SDUPTHER

## 2022-02-28 LAB
HBV CORE IGG+IGM SER QL: NEGATIVE
HBV SURFACE AB SER QL: NEGATIVE
HBV SURFACE AG SER QL: NEGATIVE

## 2022-03-03 DIAGNOSIS — F41.9 ANXIETY: ICD-10-CM

## 2022-03-03 RX ORDER — ALPRAZOLAM 0.25 MG/1
0.25 TABLET ORAL 3 TIMES DAILY
Qty: 60 TABLET | Refills: 0 | Status: SHIPPED | OUTPATIENT
Start: 2022-03-03 | End: 2022-03-22 | Stop reason: SDUPTHER

## 2022-03-22 DIAGNOSIS — F41.9 ANXIETY: ICD-10-CM

## 2022-03-22 RX ORDER — ALPRAZOLAM 0.25 MG/1
0.25 TABLET ORAL 3 TIMES DAILY
Qty: 60 TABLET | Refills: 0 | Status: SHIPPED | OUTPATIENT
Start: 2022-03-22 | End: 2022-06-09 | Stop reason: SDUPTHER

## 2022-03-22 RX ORDER — ALPRAZOLAM 0.25 MG/1
0.25 TABLET ORAL 3 TIMES DAILY
Qty: 60 TABLET | Refills: 0 | Status: SHIPPED | OUTPATIENT
Start: 2022-03-22 | End: 2022-04-07 | Stop reason: SDUPTHER

## 2022-04-07 DIAGNOSIS — F41.9 ANXIETY: ICD-10-CM

## 2022-04-07 RX ORDER — ALPRAZOLAM 0.25 MG/1
0.25 TABLET ORAL 3 TIMES DAILY
Qty: 60 TABLET | Refills: 0 | Status: CANCELLED | OUTPATIENT
Start: 2022-04-07

## 2022-04-07 RX ORDER — ALPRAZOLAM 0.25 MG/1
0.25 TABLET ORAL 3 TIMES DAILY
Qty: 60 TABLET | Refills: 0 | Status: SHIPPED | OUTPATIENT
Start: 2022-04-07 | End: 2022-04-27 | Stop reason: SDUPTHER

## 2022-04-26 DIAGNOSIS — F41.9 ANXIETY: ICD-10-CM

## 2022-04-26 RX ORDER — ALPRAZOLAM 0.25 MG/1
0.25 TABLET ORAL 3 TIMES DAILY
Qty: 60 TABLET | Refills: 0 | Status: CANCELLED | OUTPATIENT
Start: 2022-04-26

## 2022-04-27 DIAGNOSIS — F41.9 ANXIETY: ICD-10-CM

## 2022-04-27 RX ORDER — ALPRAZOLAM 0.25 MG/1
0.25 TABLET ORAL 3 TIMES DAILY
Qty: 60 TABLET | Refills: 0 | Status: SHIPPED | OUTPATIENT
Start: 2022-04-27 | End: 2022-05-13 | Stop reason: SDUPTHER

## 2022-04-27 RX ORDER — ALPRAZOLAM 0.25 MG/1
0.25 TABLET ORAL 3 TIMES DAILY
Qty: 60 TABLET | Refills: 0 | Status: CANCELLED | OUTPATIENT
Start: 2022-04-27

## 2022-05-04 LAB
ALBUMIN SERPL-MCNC: 4.6 G/DL (ref 3.5–5.7)
ALP SERPL-CCNC: 83 UNITS/L (ref 34–104)
ALT SERPL-CCNC: 16 UNITS/L (ref 7–52)
ANION GAP SERPL CALC-SCNC: 9 MMOL/L (ref 6.2–14.7)
AST SERPL-CCNC: 27 UNITS/L (ref 13–39)
BILIRUB SERPL-MCNC: 0.5 MG/DL (ref 0.3–1)
BUN SERPL-MCNC: 18 MG/DL (ref 7–25)
CALCIUM SERPL-MCNC: 10.2 MG/DL (ref 8.6–10.4)
CHLORIDE SERPL-SCNC: 105 MEQ/L (ref 98–107)
CO2 SERPL-SCNC: 24 MEQ/L (ref 21–31)
CREAT SERPL-MCNC: 0.93 MG/DL (ref 0.6–1.2)
GFR SERPLBLD SCHWARTZ-ARVRAT: 67.3 ML/MIN/1.73 M2
GLUCOSE SERPL-MCNC: 94 MG/DL (ref 70–99)
LENGTH OF FAST TIME PATIENT: NORMAL H
POTASSIUM SERPL-SCNC: 4.7 MMOL/L (ref 3.5–5.1)
PROT SERPL-MCNC: 7.9 G/DL (ref 6.4–8.9)
SODIUM SERPL-SCNC: 138 MEQ/L (ref 133–144)

## 2022-05-13 DIAGNOSIS — F41.9 ANXIETY: ICD-10-CM

## 2022-05-14 ENCOUNTER — TELEPHONE (OUTPATIENT)
Dept: SCHEDULING | Age: 67
End: 2022-05-14

## 2022-05-14 DIAGNOSIS — F41.9 ANXIETY: ICD-10-CM

## 2022-05-15 DIAGNOSIS — F41.9 ANXIETY: ICD-10-CM

## 2022-05-16 RX ORDER — ALPRAZOLAM 0.25 MG/1
0.25 TABLET ORAL 3 TIMES DAILY
Qty: 60 TABLET | Refills: 0 | Status: SHIPPED | OUTPATIENT
Start: 2022-05-16 | End: 2022-01-01 | Stop reason: SDUPTHER

## 2022-05-16 RX ORDER — ALPRAZOLAM 0.25 MG/1
0.25 TABLET ORAL 3 TIMES DAILY
Qty: 60 TABLET | Refills: 0 | Status: SHIPPED | OUTPATIENT
Start: 2022-05-16 | End: 2022-06-09 | Stop reason: CLARIF

## 2022-05-17 RX ORDER — ALPRAZOLAM 0.25 MG/1
0.25 TABLET ORAL 3 TIMES DAILY
Qty: 60 TABLET | Refills: 0 | Status: CANCELLED | OUTPATIENT
Start: 2022-05-17

## 2022-06-02 DIAGNOSIS — F41.9 ANXIETY: ICD-10-CM

## 2022-06-02 RX ORDER — ALPRAZOLAM 0.25 MG/1
0.25 TABLET ORAL 3 TIMES DAILY
Qty: 60 TABLET | Refills: 0 | Status: CANCELLED | OUTPATIENT
Start: 2022-06-02

## 2022-06-06 ENCOUNTER — TELEPHONE (OUTPATIENT)
Dept: SCHEDULING | Age: 67
End: 2022-06-06

## 2022-06-06 DIAGNOSIS — F41.9 ANXIETY: ICD-10-CM

## 2022-06-07 ENCOUNTER — TELEPHONE (OUTPATIENT)
Dept: SCHEDULING | Age: 67
End: 2022-06-07

## 2022-06-07 DIAGNOSIS — F41.9 ANXIETY: ICD-10-CM

## 2022-06-07 RX ORDER — ALPRAZOLAM 0.25 MG/1
0.25 TABLET ORAL 3 TIMES DAILY
Qty: 60 TABLET | Refills: 0 | Status: CANCELLED | OUTPATIENT
Start: 2022-06-07

## 2022-06-08 RX ORDER — ALPRAZOLAM 0.25 MG/1
0.25 TABLET ORAL 3 TIMES DAILY
Qty: 60 TABLET | Refills: 0 | Status: CANCELLED | OUTPATIENT
Start: 2022-06-08

## 2022-06-09 ENCOUNTER — TELEPHONE (OUTPATIENT)
Dept: SCHEDULING | Age: 67
End: 2022-06-09

## 2022-06-09 ENCOUNTER — OFFICE VISIT (OUTPATIENT)
Dept: INTERNAL MEDICINE | Age: 67
End: 2022-06-09

## 2022-06-09 VITALS
SYSTOLIC BLOOD PRESSURE: 130 MMHG | DIASTOLIC BLOOD PRESSURE: 85 MMHG | HEART RATE: 91 BPM | HEIGHT: 65 IN | WEIGHT: 158.73 LBS | TEMPERATURE: 98.2 F | BODY MASS INDEX: 26.45 KG/M2 | OXYGEN SATURATION: 98 %

## 2022-06-09 DIAGNOSIS — F41.9 ANXIETY: Primary | ICD-10-CM

## 2022-06-09 PROCEDURE — 99213 OFFICE O/P EST LOW 20 MIN: CPT | Performed by: INTERNAL MEDICINE

## 2022-06-09 RX ORDER — VENLAFAXINE HYDROCHLORIDE 75 MG/1
75 CAPSULE, EXTENDED RELEASE ORAL DAILY
COMMUNITY

## 2022-06-09 RX ORDER — SOFOSBUVIR, VELPATASVIR, AND VOXILAPREVIR 400; 100; 100 MG/1; MG/1; MG/1
TABLET, FILM COATED ORAL
COMMUNITY
Start: 2022-05-16

## 2022-06-09 RX ORDER — ALPRAZOLAM 0.25 MG/1
0.25 TABLET ORAL 3 TIMES DAILY
Qty: 90 TABLET | Refills: 0 | Status: SHIPPED | OUTPATIENT
Start: 2022-06-09 | End: 2022-06-09 | Stop reason: CLARIF

## 2022-06-09 RX ORDER — ALPRAZOLAM 0.25 MG/1
0.25 TABLET ORAL 3 TIMES DAILY
Qty: 60 TABLET | Refills: 0 | Status: SHIPPED | OUTPATIENT
Start: 2022-06-09 | End: 2022-06-09 | Stop reason: CLARIF

## 2022-06-09 ASSESSMENT — COGNITIVE AND FUNCTIONAL STATUS - GENERAL
BECAUSE OF A PHYSICAL, MENTAL, OR EMOTIONAL CONDITION, DO YOU HAVE SERIOUS DIFFICULTY CONCENTRATING, REMEMBERING OR MAKING DECISIONS: NO
DO YOU HAVE DIFFICULTY DRESSING OR BATHING: NO
BECAUSE OF A PHYSICAL, MENTAL, OR EMOTIONAL CONDITION, DO YOU HAVE DIFFICULTY DOING ERRANDS ALONE: NO
DO YOU HAVE SERIOUS DIFFICULTY WALKING OR CLIMBING STAIRS: NO

## 2022-06-09 ASSESSMENT — PATIENT HEALTH QUESTIONNAIRE - PHQ9
CLINICAL INTERPRETATION OF PHQ2 SCORE: NO FURTHER SCREENING NEEDED
SUM OF ALL RESPONSES TO PHQ9 QUESTIONS 1 AND 2: 2
1. LITTLE INTEREST OR PLEASURE IN DOING THINGS: SEVERAL DAYS
SUM OF ALL RESPONSES TO PHQ9 QUESTIONS 1 AND 2: 2
2. FEELING DOWN, DEPRESSED OR HOPELESS: SEVERAL DAYS

## 2023-01-01 ENCOUNTER — TELEPHONE (OUTPATIENT)
Dept: INTERNAL MEDICINE | Age: 68
End: 2023-01-01

## 2023-01-01 ENCOUNTER — OFFICE VISIT (OUTPATIENT)
Dept: INTERNAL MEDICINE | Age: 68
End: 2023-01-01

## 2023-01-01 ENCOUNTER — E-ADVICE (OUTPATIENT)
Dept: INTERNAL MEDICINE | Age: 68
End: 2023-01-01

## 2023-01-01 VITALS
HEIGHT: 65 IN | WEIGHT: 156.75 LBS | TEMPERATURE: 97.8 F | DIASTOLIC BLOOD PRESSURE: 78 MMHG | OXYGEN SATURATION: 97 % | BODY MASS INDEX: 26.12 KG/M2 | HEART RATE: 90 BPM | SYSTOLIC BLOOD PRESSURE: 127 MMHG

## 2023-01-01 DIAGNOSIS — F32.A DEPRESSION: ICD-10-CM

## 2023-01-01 DIAGNOSIS — F41.9 ANXIETY: ICD-10-CM

## 2023-01-01 DIAGNOSIS — B18.2 HEPATITIS C VIRUS CARRIER STATE (CMD): ICD-10-CM

## 2023-01-01 DIAGNOSIS — Z00.00 MEDICARE ANNUAL WELLNESS VISIT, INITIAL: Primary | ICD-10-CM

## 2023-01-01 PROCEDURE — G0402 INITIAL PREVENTIVE EXAM: HCPCS | Performed by: INTERNAL MEDICINE

## 2023-01-01 RX ORDER — SERTRALINE HYDROCHLORIDE 100 MG/1
100 TABLET, FILM COATED ORAL DAILY
Qty: 90 TABLET | Refills: 3 | Status: SHIPPED | OUTPATIENT
Start: 2023-01-01 | End: 2023-01-01 | Stop reason: SDUPTHER

## 2023-01-01 RX ORDER — ALPRAZOLAM 0.25 MG/1
0.25 TABLET ORAL 3 TIMES DAILY
Qty: 90 TABLET | Refills: 0 | Status: CANCELLED | OUTPATIENT
Start: 2023-01-01

## 2023-01-01 RX ORDER — ALPRAZOLAM 0.25 MG/1
0.25 TABLET ORAL 3 TIMES DAILY
Qty: 90 TABLET | Refills: 0 | Status: SHIPPED | OUTPATIENT
Start: 2023-01-01 | End: 2023-06-28

## 2023-01-01 RX ORDER — ALPRAZOLAM 0.25 MG/1
0.25 TABLET ORAL 3 TIMES DAILY
Qty: 90 TABLET | Refills: 0 | Status: SHIPPED | OUTPATIENT
Start: 2023-01-01 | End: 2023-01-01 | Stop reason: SDUPTHER

## 2023-01-01 RX ORDER — SERTRALINE HYDROCHLORIDE 100 MG/1
100 TABLET, FILM COATED ORAL DAILY
Qty: 90 TABLET | Refills: 3 | Status: SHIPPED | OUTPATIENT
Start: 2023-01-01 | End: 2023-06-28

## 2023-01-01 ASSESSMENT — PATIENT HEALTH QUESTIONNAIRE - PHQ9
SUM OF ALL RESPONSES TO PHQ9 QUESTIONS 1 AND 2: 0
2. FEELING DOWN, DEPRESSED OR HOPELESS: NOT AT ALL
SUM OF ALL RESPONSES TO PHQ9 QUESTIONS 1 AND 2: 0
CLINICAL INTERPRETATION OF PHQ2 SCORE: 0
SUM OF ALL RESPONSES TO PHQ9 QUESTIONS 1 AND 2: 0
CLINICAL INTERPRETATION OF PHQ2 SCORE: NO FURTHER SCREENING NEEDED
CLINICAL INTERPRETATION OF PHQ2 SCORE: NO FURTHER SCREENING NEEDED
1. LITTLE INTEREST OR PLEASURE IN DOING THINGS: NOT AT ALL
1. LITTLE INTEREST OR PLEASURE IN DOING THINGS: NOT AT ALL
2. FEELING DOWN, DEPRESSED OR HOPELESS: NOT AT ALL

## 2023-01-01 ASSESSMENT — COGNITIVE AND FUNCTIONAL STATUS - GENERAL
DO YOU HAVE DIFFICULTY DRESSING OR BATHING: NO
BECAUSE OF A PHYSICAL, MENTAL, OR EMOTIONAL CONDITION, DO YOU HAVE SERIOUS DIFFICULTY CONCENTRATING, REMEMBERING OR MAKING DECISIONS: NO
BECAUSE OF A PHYSICAL, MENTAL, OR EMOTIONAL CONDITION, DO YOU HAVE DIFFICULTY DOING ERRANDS ALONE: NO
DO YOU HAVE SERIOUS DIFFICULTY WALKING OR CLIMBING STAIRS: NO
BECAUSE OF A PHYSICAL, MENTAL, OR EMOTIONAL CONDITION, DO YOU HAVE SERIOUS DIFFICULTY CONCENTRATING, REMEMBERING OR MAKING DECISIONS: NO
DO YOU HAVE SERIOUS DIFFICULTY WALKING OR CLIMBING STAIRS: NO
DO YOU HAVE DIFFICULTY DRESSING OR BATHING: NO
BECAUSE OF A PHYSICAL, MENTAL, OR EMOTIONAL CONDITION, DO YOU HAVE DIFFICULTY DOING ERRANDS ALONE: NO
ARE YOU BLIND OR DO YOU HAVE SERIOUS DIFFICULTY SEEING, EVEN WHEN WEARING GLASSES: NO
ARE YOU DEAF OR DO YOU HAVE SERIOUS DIFFICULTY  HEARING: NO

## 2023-01-01 ASSESSMENT — ACTIVITIES OF DAILY LIVING (ADL)
SENSORY_SUPPORT_DEVICES: EYEGLASSES
ADL_SHORT_OF_BREATH: NO
RECENT_DECLINE_ADL: NO
ADL_SCORE: 12
ADL_BEFORE_ADMISSION: INDEPENDENT
NEEDS_ASSIST: NO

## 2023-01-01 ASSESSMENT — VISUAL ACUITY
OD_CC: 20/40
OS_CC: 20/40

## 2023-01-01 ASSESSMENT — MINI COG
PATIENT ABLE TO FILL IN THE CLOCK FACE WITH 10 MINUTES PAST 11 O'CLOCK?: YES, CLOCK IS CORRECT
PATIENT WAS GIVEN REPEAT BACK WORDS FROM VERSION: 1 - BANANA SUNRISE CHAIR
PATIENT ABLE TO REPEAT THE 3 WORDS GIVEN PREVIOUSLY?: WAS ABLE TO REPEAT BACK 3 WORDS CORRECTLY
TOTAL SCORE: 5

## 2023-01-01 ASSESSMENT — PAIN SCALES - GENERAL: PAINLEVEL: 0
